# Patient Record
Sex: FEMALE | Race: WHITE | NOT HISPANIC OR LATINO | Employment: UNEMPLOYED | RURAL
[De-identification: names, ages, dates, MRNs, and addresses within clinical notes are randomized per-mention and may not be internally consistent; named-entity substitution may affect disease eponyms.]

---

## 2019-06-27 ENCOUNTER — OFFICE VISIT (OUTPATIENT)
Dept: RETAIL CLINIC | Facility: CLINIC | Age: 19
End: 2019-06-27

## 2019-06-27 VITALS
HEART RATE: 70 BPM | OXYGEN SATURATION: 100 % | BODY MASS INDEX: 24.33 KG/M2 | HEIGHT: 67 IN | WEIGHT: 155 LBS | SYSTOLIC BLOOD PRESSURE: 110 MMHG | TEMPERATURE: 97.9 F | DIASTOLIC BLOOD PRESSURE: 66 MMHG | RESPIRATION RATE: 14 BRPM

## 2019-06-27 DIAGNOSIS — J01.41 ACUTE RECURRENT PANSINUSITIS: Primary | ICD-10-CM

## 2019-06-27 DIAGNOSIS — J30.2 SEASONAL ALLERGIC RHINITIS, UNSPECIFIED TRIGGER: ICD-10-CM

## 2019-06-27 PROCEDURE — 99203 OFFICE O/P NEW LOW 30 MIN: CPT | Performed by: NURSE PRACTITIONER

## 2019-06-27 RX ORDER — SERTRALINE HYDROCHLORIDE 100 MG/1
100 TABLET, FILM COATED ORAL DAILY
Refills: 0 | COMMUNITY
Start: 2019-05-22 | End: 2022-08-03

## 2019-06-27 RX ORDER — OMEPRAZOLE 20 MG/1
20 CAPSULE, DELAYED RELEASE ORAL DAILY
Refills: 0 | COMMUNITY
Start: 2019-05-22 | End: 2022-08-03

## 2019-06-27 RX ORDER — CHLORAL HYDRATE 500 MG
CAPSULE ORAL
COMMUNITY
End: 2021-06-16

## 2019-06-27 RX ORDER — PSEUDOEPHEDRINE HCL 120 MG/1
120 TABLET, FILM COATED, EXTENDED RELEASE ORAL EVERY 12 HOURS
Qty: 20 TABLET | Refills: 0 | Status: SHIPPED | OUTPATIENT
Start: 2019-06-27 | End: 2019-07-07

## 2019-06-27 RX ORDER — BUPROPION HYDROCHLORIDE 150 MG/1
150 TABLET ORAL DAILY
Refills: 0 | COMMUNITY
Start: 2019-05-22 | End: 2019-06-27

## 2019-06-27 RX ORDER — LORATADINE 10 MG/1
10 TABLET ORAL DAILY
Qty: 30 TABLET | Refills: 5 | Status: SHIPPED | OUTPATIENT
Start: 2019-06-27 | End: 2019-07-27

## 2019-06-27 RX ORDER — AMOXICILLIN AND CLAVULANATE POTASSIUM 875; 125 MG/1; MG/1
1 TABLET, FILM COATED ORAL 2 TIMES DAILY
Qty: 20 TABLET | Refills: 0 | Status: SHIPPED | OUTPATIENT
Start: 2019-06-27 | End: 2021-06-16

## 2019-06-27 NOTE — PROGRESS NOTES
"Subjective   Alissa Plascencia is a 19 y.o. female.     Sinus Problem   This is a recurrent problem. Episode onset: 2 weeks  The problem has been gradually worsening since onset. There has been no fever. The pain is severe. Associated symptoms include congestion, coughing (mild), headaches, sinus pressure and swollen glands. Pertinent negatives include no chills, ear pain, hoarse voice, neck pain, shortness of breath, sneezing or sore throat. Past treatments include nothing.        The following portions of the patient's history were reviewed and updated as appropriate: allergies, current medications, past medical history, past social history, past surgical history and problem list.    Review of Systems   Constitutional: Negative for appetite change, chills and fever.   HENT: Positive for congestion, postnasal drip, rhinorrhea (thick greenish), sinus pressure and sinus pain (severe). Negative for ear pain, hoarse voice, sneezing, sore throat and trouble swallowing.    Eyes: Negative.    Respiratory: Positive for cough (mild). Negative for chest tightness, shortness of breath and wheezing.    Cardiovascular: Negative.    Gastrointestinal: Negative for abdominal pain, diarrhea, nausea and vomiting.   Musculoskeletal: Negative.  Negative for neck pain.   Skin: Negative.    Neurological: Positive for headaches. Negative for dizziness.   Hematological: Positive for adenopathy.        /66   Pulse 70   Temp 97.9 °F (36.6 °C)   Resp 14   Ht 170.2 cm (67\")   Wt 70.3 kg (155 lb)   LMP 06/27/2019 Comment: NEXPLANON  SpO2 100%   BMI 24.28 kg/m²      Objective   Physical Exam   Constitutional: She is oriented to person, place, and time. Vital signs are normal. She appears well-developed and well-nourished. No distress.   HENT:   Head: Normocephalic.   Right Ear: External ear and ear canal normal. No drainage, swelling or tenderness. Tympanic membrane is bulging. Tympanic membrane is not erythematous.   Left Ear: " External ear and ear canal normal. No drainage, swelling or tenderness. Tympanic membrane is bulging. Tympanic membrane is not erythematous.   Nose: Mucosal edema and rhinorrhea (thick, greenish/yellow) present. Right sinus exhibits maxillary sinus tenderness (severe) and frontal sinus tenderness. Left sinus exhibits maxillary sinus tenderness (severe) and frontal sinus tenderness.   Mouth/Throat: Uvula is midline, oropharynx is clear and moist and mucous membranes are normal. Tonsils are 0 on the right. Tonsils are 0 on the left. No tonsillar exudate.   Eyes: Conjunctivae are normal. Pupils are equal, round, and reactive to light.   Neck: Normal range of motion. Neck supple.   Cardiovascular: Normal rate, regular rhythm, S1 normal, S2 normal and normal heart sounds.   Pulmonary/Chest: Effort normal and breath sounds normal. No stridor. No respiratory distress. She has no decreased breath sounds. She has no wheezes. She has no rhonchi. She has no rales.   Abdominal: Soft. Bowel sounds are normal. She exhibits no distension. There is no tenderness. There is no rebound and no guarding.   Lymphadenopathy:        Head (right side): Tonsillar adenopathy present.        Head (left side): Tonsillar adenopathy present.     She has no cervical adenopathy.   Neurological: She is alert and oriented to person, place, and time.   Skin: Skin is warm, dry and intact. No rash noted. She is not diaphoretic.   Psychiatric: She has a normal mood and affect. Her speech is normal and behavior is normal. Thought content normal.   Vitals reviewed.      Assessment/Plan   Alissa was seen today for sinus problem and cough.    Diagnoses and all orders for this visit:    Acute recurrent pansinusitis  -     amoxicillin-clavulanate (AUGMENTIN) 875-125 MG per tablet; Take 1 tablet by mouth 2 (Two) Times a Day.  -     pseudoephedrine (SUDAFED) 120 MG 12 hr tablet; Take 1 tablet by mouth Every 12 (Twelve) Hours for 10 days.    Seasonal allergic  rhinitis, unspecified trigger  -     loratadine (CLARITIN) 10 MG tablet; Take 1 tablet by mouth Daily for 30 days.

## 2021-06-16 ENCOUNTER — OFFICE VISIT (OUTPATIENT)
Dept: NEUROLOGY | Facility: CLINIC | Age: 21
End: 2021-06-16

## 2021-06-16 VITALS
HEART RATE: 54 BPM | DIASTOLIC BLOOD PRESSURE: 76 MMHG | OXYGEN SATURATION: 100 % | SYSTOLIC BLOOD PRESSURE: 110 MMHG | HEIGHT: 66 IN | WEIGHT: 134 LBS | BODY MASS INDEX: 21.53 KG/M2

## 2021-06-16 DIAGNOSIS — F07.81 POST CONCUSSION SYNDROME: Primary | ICD-10-CM

## 2021-06-16 PROCEDURE — 99204 OFFICE O/P NEW MOD 45 MIN: CPT | Performed by: PSYCHIATRY & NEUROLOGY

## 2021-06-16 RX ORDER — BUSPIRONE HYDROCHLORIDE 10 MG/1
10 TABLET ORAL 3 TIMES DAILY
COMMUNITY
End: 2022-08-03

## 2021-06-16 RX ORDER — HYDROXYZINE HYDROCHLORIDE 10 MG/1
10 TABLET, FILM COATED ORAL 3 TIMES DAILY PRN
COMMUNITY
End: 2022-01-19 | Stop reason: ALTCHOICE

## 2021-06-16 RX ORDER — ZONISAMIDE 50 MG/1
CAPSULE ORAL
COMMUNITY
Start: 2021-03-23 | End: 2021-11-08

## 2021-06-16 NOTE — PROGRESS NOTES
Subjective:    CC: Alissa Plascencia is seen today in consultation at the request of Adelaida Lee MD for Concussion       HPI:  Patient is a 21-year-old female with past medical history of anxiety, depression referred to the clinic for evaluation of postconcussive symptoms.  She reports that back in January 2018 while taking part in cheerleading competition, she fell and hit the back of her head and the neck area and passed out for approximately 5 to 10 seconds.  Soon after the event, she was told by her  to continue taking part in the competition and no medical treatment was seek for.  Next day, she had another fall during competition where she again hit side of her head and was confused for several minutes. She reports that soon after the second fall, she started having difficulty in completing sentences, difficulty with reading became extremely sensitive to light and hence 1 week later, she was taken to sports medicine and then was eventually sent to [facility] and did physical therapy.  She does not remember what kind of testing was done when she was taken to the hospital.  She was doing better until about 2020 when she was moving her apartment and hit back of the head to the bed rail and lost consciousness for few seconds.  She was seen by physician and then was sent for neck physical therapy and was asked to wear neck brace.  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  She also has been having episodes of spacing out, confusion while at work which would last for several seconds and may occur several times in a day.  In addition, she also has this episodes of sharp shooting type of pain involving different parts of her head.  Because of this, she was started on Zonegran 50 mg twice daily by [facility]  "neurology.  She reports that she is unable to take twice daily dosing consistently but since starting the medication, overall headache and episodes of spacing out have reduced.  MRI or EEG has not been performed.  She does take Zoloft 1 tablet daily and buspirone as needed and has been taking it before she sustained the fall.    The following portions of the patient's history were reviewed today and updated as of 06/16/2021  : allergies, social history and problem list.  This document will be scanned to patient's chart.      Current Outpatient Medications:   •  busPIRone (BUSPAR) 10 MG tablet, Take 10 mg by mouth 3 (Three) Times a Day., Disp: , Rfl:   •  hydrOXYzine (ATARAX) 10 MG tablet, Take 10 mg by mouth 3 (Three) Times a Day As Needed for Itching., Disp: , Rfl:   •  omeprazole (priLOSEC) 20 MG capsule, Take 20 mg by mouth Daily., Disp: , Rfl: 0  •  sertraline (ZOLOFT) 100 MG tablet, Take 100 mg by mouth Daily., Disp: , Rfl: 0  •  zonisamide (ZONEGRAN) 50 MG capsule, PLEASE SEE ATTACHED FOR DETAILED DIRECTIONS, Disp: , Rfl:    Past Medical History:   Diagnosis Date   • Acid reflux    • Allergic    • Anxiety    • Depression    • Infectious mononucleosis    • Migraines    • Sinusitis    • Stomach ulcer       History reviewed. No pertinent surgical history.   History reviewed. No pertinent family history.   Review of Systems   Constitutional: Negative.    HENT: Negative.    Eyes: Positive for photophobia.   Respiratory: Negative.    Cardiovascular: Negative.    Gastrointestinal: Negative.    Endocrine: Negative.    Genitourinary: Negative.    Musculoskeletal: Positive for back pain and neck pain.   Skin: Negative.    Allergic/Immunologic: Negative.    Neurological: Positive for headache and confusion.   Hematological: Negative.    Psychiatric/Behavioral: The patient is nervous/anxious.        All other systems reviewed and are negative     Objective:    /76   Pulse 54   Ht 167.6 cm (66\")   Wt 60.8 kg (134 " lb)   SpO2 100%   BMI 21.63 kg/m²     Neurology Exam:    General apperance: NAD.     Mental status: Alert, awake and oriented to time place and person.    Recent and Remote memory: Can recall 3/3 objects at 5 minutes. Can recall historical events.     Attention span and Concentration: Serial 7s: Normal.     Fund of knowledge:  Normal.     Language and Speech: No aphasia or dysarthria.    Naming , Repitition and Comprehension:  Can name objects, repeat a sentence and follow commands. Speech is clear and fluent with good repetition, comprehension, and naming.    Cranial Nerves:   CN II: Visual fields are full. Intact. Fundi - Normal, No papillederma, Pupils - NICO  CN III, IV and VI: Extraocular movements are intact. Normal saccades.   CN V: Facial sensation is intact.   CN VII: Muscles of facial expression reveal no asymmetry. Intact.   CN VIII: Hearing is intact. Whispered voice intact.   CN IX and X: Palate elevates symmetrically. Intact  CN XI: Shoulder shrug is intact.   CN XII: Tongue is midline without evidence of atrophy or fasciculation.     Motor:  Right UE muscle strength 5/5. Normal tone.     Left UE muscle strength 5/5. Normal tone.      Right LE muscle strength5/5. Normal tone.     Left LE muscle strength 5/5. Normal tone.      Sensory: Normal light touch, vibration and pinprick sensation bilaterally.    DTRs: 2+ bilaterally in upper and lower extremities.    Babinski: Negative bilaterally.    Co-ordination: Normal finger-to-nose, heel to shin B/L.    Rhomberg: Negative.    Gait: Normal.    Cardiovascular: Regular rate and rhythm without murmur, gallop or rub.    Ophthalmoscopic exam: Normal fundi, no papilledema.    Assessment and Plan:  1. Post concussion syndrome  Postconcussive symptoms.  She had a fall back in 2018 and then another 1 in 2020 causing very brief loss of consciousness for about 5 seconds..  Currently the most concerning symptoms are episodes of confusion as well as postconcussive  headache.  With Zonegran 50 mg daily, symptoms have subsided somewhat.  Before any dose adjustment or new medication is considered, I would like to order MRI brain and EEG for further evaluation and based on the report, further treatment options will be discussed with her.  I have advised her to continue with light activity at work otherwise, I will see her back in 6 to 8 weeks for follow-up.       Return in about 8 weeks (around 8/11/2021).     Mehran Muniz MD

## 2021-07-19 ENCOUNTER — HOSPITAL ENCOUNTER (OUTPATIENT)
Dept: MRI IMAGING | Facility: HOSPITAL | Age: 21
Discharge: HOME | End: 2021-07-19

## 2021-07-19 ENCOUNTER — HOSPITAL ENCOUNTER (OUTPATIENT)
Dept: NEUROLOGY | Facility: HOSPITAL | Age: 21
Discharge: HOME | End: 2021-07-19

## 2021-07-19 ENCOUNTER — TELEPHONE (OUTPATIENT)
Dept: NEUROLOGY | Facility: CLINIC | Age: 21
End: 2021-07-19

## 2021-07-19 DIAGNOSIS — F07.81 POST CONCUSSION SYNDROME: ICD-10-CM

## 2021-07-19 PROCEDURE — 95816 EEG AWAKE AND DROWSY: CPT | Performed by: PSYCHIATRY & NEUROLOGY

## 2021-07-19 PROCEDURE — 95816 EEG AWAKE AND DROWSY: CPT

## 2021-07-19 PROCEDURE — 70551 MRI BRAIN STEM W/O DYE: CPT

## 2021-07-19 NOTE — TELEPHONE ENCOUNTER
----- Message from Mehran Muniz MD sent at 7/19/2021 10:05 AM EDT -----  Inform patient normal.  MRI brain is normal.

## 2021-08-05 ENCOUNTER — OFFICE VISIT (OUTPATIENT)
Dept: NEUROLOGY | Facility: CLINIC | Age: 21
End: 2021-08-05

## 2021-08-05 VITALS — HEIGHT: 66 IN | HEART RATE: 57 BPM | WEIGHT: 130 LBS | OXYGEN SATURATION: 100 % | BODY MASS INDEX: 20.89 KG/M2

## 2021-08-05 DIAGNOSIS — F07.81 POST CONCUSSION SYNDROME: Primary | ICD-10-CM

## 2021-08-05 PROCEDURE — 99214 OFFICE O/P EST MOD 30 MIN: CPT | Performed by: PSYCHIATRY & NEUROLOGY

## 2021-08-05 RX ORDER — ETONOGESTREL AND ETHINYL ESTRADIOL 11.7; 2.7 MG/1; MG/1
INSERT, EXTENDED RELEASE VAGINAL
COMMUNITY
Start: 2021-07-20

## 2021-08-05 NOTE — PROGRESS NOTES
Subjective:    CC: Alissa Plascencia is in clinic today for follow up for postconcussive symptoms.    HPI:  Initial visit: 6/16/2021: Patient is a 21-year-old female with past medical history of anxiety, depression referred to the clinic for evaluation of postconcussive symptoms.  She reports that back in January 2018 while taking part in cheerleading competition, she fell and hit the back of her head and the neck area and passed out for approximately 5 to 10 seconds.  Soon after the event, she was told by her  to continue taking part in the competition and no medical treatment was seek for.  Next day, she had another fall during competition where she again hit side of her head and was confused for several minutes. She reports that soon after the second fall, she started having difficulty in completing sentences, difficulty with reading became extremely sensitive to light and hence 1 week later, she was taken to sports medicine and then was eventually sent to [facility] and did physical therapy.  She does not remember what kind of testing was done when she was taken to the hospital.  She was doing better until about 2020 when she was moving her apartment and hit back of the head to the bed rail and lost consciousness for few seconds.  She was seen by physician and then was sent for neck physical therapy and was asked to wear neck brace.  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  She also has been having episodes of spacing out, confusion while at work which would last for several seconds and may occur several times in a day.  In addition, she also has this episodes of sharp shooting type of pain involving different parts of her head.  Because of this, she was started on Zonegran 50 mg twice daily by [facility]  neurology.  She reports that she is unable to take twice daily dosing consistently but since starting the medication, overall headache and episodes of spacing out have reduced.  MRI or EEG has not been performed.  She does take Zoloft 1 tablet daily and buspirone as needed and has been taking it before she sustained the fall.    Follow-up: 8/5/2021: She is in clinic for regular follow-up.  Since her last visit in June, she reports that overall symptoms of confusion and sharp shooting type of pain involving different parts of the head have reduced even further.  She has now completed MRI brain and EEG.  I reviewed MRI brain personally and it did not reveal any intracranial abnormalities.  EEG was essentially normal as well.    The following portions of the patient's history were reviewed and updated as of 08/05/2021: allergies, social history and problem list.       Current Outpatient Medications:   •  busPIRone (BUSPAR) 10 MG tablet, Take 10 mg by mouth 3 (Three) Times a Day., Disp: , Rfl:   •  etonogestrel-ethinyl estradiol (NUVARING) 0.12-0.015 MG/24HR vaginal ring, INSERT 1 RING EVERY 4 WEEKS AS DIRECTED, Disp: , Rfl:   •  hydrOXYzine (ATARAX) 10 MG tablet, Take 10 mg by mouth 3 (Three) Times a Day As Needed for Itching., Disp: , Rfl:   •  omeprazole (priLOSEC) 20 MG capsule, Take 20 mg by mouth Daily., Disp: , Rfl: 0  •  sertraline (ZOLOFT) 100 MG tablet, Take 100 mg by mouth Daily., Disp: , Rfl: 0  •  zonisamide (ZONEGRAN) 50 MG capsule, PLEASE SEE ATTACHED FOR DETAILED DIRECTIONS, Disp: , Rfl:    Past Medical History:   Diagnosis Date   • Acid reflux    • Allergic    • Anxiety    • Depression    • Infectious mononucleosis    • Migraines    • Sinusitis    • Stomach ulcer       History reviewed. No pertinent surgical history.   History reviewed. No pertinent family history.     Review of Systems   Constitutional: Positive for fatigue.   HENT: Negative.    Eyes: Negative.    Respiratory: Negative.   "  Cardiovascular: Negative.    Gastrointestinal: Negative.    Endocrine: Negative.    Genitourinary: Negative.    Musculoskeletal: Positive for back pain, neck pain and neck stiffness.   Skin: Negative.    Allergic/Immunologic: Negative.    Neurological: Positive for headache.   Hematological: Negative.    Psychiatric/Behavioral: Positive for depressed mood. The patient is nervous/anxious.      Objective:    Pulse 57   Ht 167.6 cm (66\")   Wt 59 kg (130 lb)   SpO2 100%   BMI 20.98 kg/m²     Neurology Exam:  General apperance: NAD.     Mental status: Alert, awake and oriented to time place and person.    Recent and Remote memory: Can recall 3/3 objects at 5 minutes. Can recall historical events.     Attention span and Concentration: Serial 7s: Normal.     Fund of knowledge:  Normal.     Language and Speech: No aphasia or dysarthria.    Naming , Repitition and Comprehension:  Can name objects, repeat a sentence and follow commands. Speech is clear and fluent with good repetition, comprehension, and naming.    CN II to XII: Intact.    Opthalmoscopic Exam: No papilledema.    Motor:  Right UE muscle strength 5/5. Normal tone.     Left UE muscle strength 5/5. Normal tone.      Right LE muscle strength5/5. Normal tone.     Left LE muscle strength 5/5. Normal tone.      Sensory: Normal light touch, vibration and pinprick sensation bilaterally.    DTRs: 2+ bilaterally.    Babinski: Negative bilaterally.    Co-ordination: Normal finger-to-nose, heel to shin B/L.    Rhomberg: Negative.    Gait: Normal.    Cardiovascular: Regular rate and rhythm without murmur, gallop or rub.    Assessment and Plan:  1. Post concussion syndrome  -Overall symptoms of episodes of confusion and headaches improved since her initial visit in June.  She has a normal MRI and EEG which is quite reassuring.  I have advised her to continue Zonegran 50 mg for now and I plan to see her back in 3 months for follow-up.  At that time, the symptoms are " reduced further than plan is to stop Zonegran completely.  I have reassured her that symptoms will completely subside eventually on its own.       I spent 30 minutes face to face with the patient and spent more than 50% of this time  in management, instructions and education regarding above mentioned diagnosis and also on counseling and discussing about taking medication regularly, possible side effects with medication use, importance of good sleep hygiene, good hydration and regular exercise.    Return in about 3 months (around 11/5/2021).

## 2021-11-08 ENCOUNTER — OFFICE VISIT (OUTPATIENT)
Dept: NEUROLOGY | Facility: CLINIC | Age: 21
End: 2021-11-08

## 2021-11-08 VITALS — HEART RATE: 77 BPM | HEIGHT: 66 IN | OXYGEN SATURATION: 99 % | BODY MASS INDEX: 20.89 KG/M2 | WEIGHT: 130 LBS

## 2021-11-08 DIAGNOSIS — F07.81 POST CONCUSSION SYNDROME: Primary | ICD-10-CM

## 2021-11-08 PROCEDURE — 99214 OFFICE O/P EST MOD 30 MIN: CPT | Performed by: PSYCHIATRY & NEUROLOGY

## 2021-11-08 RX ORDER — BUPROPION HYDROCHLORIDE 150 MG/1
150 TABLET ORAL
COMMUNITY
Start: 2021-09-23 | End: 2022-08-03

## 2021-11-08 RX ORDER — TRETINOIN 0.1 MG/G
GEL TOPICAL SEE ADMIN INSTRUCTIONS
COMMUNITY
Start: 2021-09-23 | End: 2022-01-21

## 2021-11-08 NOTE — PROGRESS NOTES
Subjective:    CC: Alissa Plascencia is in clinic today for follow up for history of postconcussive symptoms.    HPI:  Initial visit: 6/16/2021: Patient is a 21-year-old female with past medical history of anxiety, depression referred to the clinic for evaluation of postconcussive symptoms.  She reports that back in January 2018 while taking part in cheerleading competition, she fell and hit the back of her head and the neck area and passed out for approximately 5 to 10 seconds.  Soon after the event, she was told by her  to continue taking part in the competition and no medical treatment was seek for.  Next day, she had another fall during competition where she again hit side of her head and was confused for several minutes. She reports that soon after the second fall, she started having difficulty in completing sentences, difficulty with reading became extremely sensitive to light and hence 1 week later, she was taken to sports medicine and then was eventually sent to [facility] and did physical therapy.  She does not remember what kind of testing was done when she was taken to the hospital.  She was doing better until about 2020 when she was moving her apartment and hit back of the head to the bed rail and lost consciousness for few seconds.  She was seen by physician and then was sent for neck physical therapy and was asked to wear neck brace.  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  She also has been having episodes of spacing out, confusion while at work which would last for several seconds and may occur several times in a day.  In addition, she also has this episodes of sharp shooting type of pain involving different parts of her head.  Because of this, she was started on Zonegran 50 mg twice  daily by  neurology.  She reports that she is unable to take twice daily dosing consistently but since starting the medication, overall headache and episodes of spacing out have reduced.  MRI or EEG has not been performed.  She does take Zoloft 1 tablet daily and buspirone as needed and has been taking it before she sustained the fall.    Follow-up: 8/5/2021: She is in clinic for regular follow-up.  Since her last visit in June, she reports that overall symptoms of confusion and sharp shooting type of pain involving different parts of the head have reduced even further.  She has now completed MRI brain and EEG.  I reviewed MRI brain personally and it did not reveal any intracranial abnormalities.  EEG was essentially normal as well.    Follow-up: 11/8/2021: She is in clinic for regular follow-up.  Since her last visit in August, she reports that postconcussive symptoms have completely resolved.  She however has been having difficulty with sleep.  The days that she does not sleep well, next day she is fatigued and it causes muscle aches and pains in neck and lower back which ultimately causes her to have severe migraine.  She takes Advil as needed at the onset of such migraine which sometimes works and sometimes it does not.  She is also scheduled to see [facility] psychology to discuss different treatment options to help with sleep.    The following portions of the patient's history were reviewed and updated as of 11/08/2021: allergies, social history and problem list.       Current Outpatient Medications:   •  buPROPion XL (WELLBUTRIN XL) 150 MG 24 hr tablet, Take 150 mg by mouth., Disp: , Rfl:   •  busPIRone (BUSPAR) 10 MG tablet, Take 10 mg by mouth 3 (Three) Times a Day., Disp: , Rfl:   •  etonogestrel-ethinyl estradiol (NUVARING) 0.12-0.015 MG/24HR vaginal ring, INSERT 1 RING EVERY 4 WEEKS AS DIRECTED, Disp: , Rfl:   •  hydrOXYzine (ATARAX) 10 MG tablet, Take 10 mg by mouth 3 (Three) Times a Day As Needed for  "Itching., Disp: , Rfl:   •  omeprazole (priLOSEC) 20 MG capsule, Take 20 mg by mouth Daily., Disp: , Rfl: 0  •  ProAir  (90 Base) MCG/ACT inhaler, , Disp: , Rfl:   •  sertraline (ZOLOFT) 100 MG tablet, Take 100 mg by mouth Daily., Disp: , Rfl: 0  •  tretinoin (RETIN-A) 0.01 % gel, Apply  topically to the appropriate area as directed See Admin Instructions., Disp: , Rfl:   •  ubrogepant (ubrogepant) 100 MG tablet, Take 1 tablet by mouth As Needed (Migraine) for up to 30 days., Disp: 16 tablet, Rfl: 3   Past Medical History:   Diagnosis Date   • Acid reflux    • Allergic    • Anxiety    • Depression    • Infectious mononucleosis    • Migraines    • Sinusitis    • Stomach ulcer       History reviewed. No pertinent surgical history.   History reviewed. No pertinent family history.     Review of Systems   Constitutional: Negative.    HENT: Negative.    Eyes: Negative.    Respiratory: Negative.    Cardiovascular: Negative.    Gastrointestinal: Negative.    Endocrine: Negative.    Genitourinary: Negative.    Musculoskeletal: Negative.    Skin: Negative.    Allergic/Immunologic: Negative.    Neurological: Positive for headache.   Hematological: Negative.    Psychiatric/Behavioral: Negative.      Objective:    Pulse 77   Ht 167.6 cm (66\")   Wt 59 kg (130 lb)   SpO2 99%   BMI 20.98 kg/m²     Neurology Exam:  General apperance: NAD.     Mental status: Alert, awake and oriented to time place and person.    Recent and Remote memory: Can recall 3/3 objects at 5 minutes. Can recall historical events.     Attention span and Concentration: Serial 7s: Normal.     Fund of knowledge:  Normal.     Language and Speech: No aphasia or dysarthria.    Naming , Repitition and Comprehension:  Can name objects, repeat a sentence and follow commands. Speech is clear and fluent with good repetition, comprehension, and naming.    CN II to XII: Intact.    Opthalmoscopic Exam: No papilledema.    Motor:  Right UE muscle strength 5/5. " Normal tone.     Left UE muscle strength 5/5. Normal tone.      Right LE muscle strength5/5. Normal tone.     Left LE muscle strength 5/5. Normal tone.      Sensory: Normal light touch, vibration and pinprick sensation bilaterally.    DTRs: 2+ bilaterally.    Babinski: Negative bilaterally.    Co-ordination: Normal finger-to-nose, heel to shin B/L.    Rhomberg: Negative.    Gait: Normal.    Cardiovascular: Regular rate and rhythm without murmur, gallop or rub.    Assessment and Plan:  1. Post concussion syndrome  -Postconcussive symptoms have resolved completely however, she is having trouble with sleep and is scheduled to see [facility] for treatment options.  She is reporting that lack of sleep is triggering migraines.  I am going to prescribe her Ubrelvy to be taken as needed as an abortive treatment and I will plan to see her back in 3 months for follow-up.       I spent 30 minutes face to face with the patient and spent more than 50% of this time  in management, instructions and education regarding above mentioned diagnosis and also on counseling and discussing about taking medication regularly, possible side effects with medication use, importance of good sleep hygiene, good hydration and regular exercise.    Return in about 3 months (around 2/8/2022).

## 2021-12-20 ENCOUNTER — TELEPHONE (OUTPATIENT)
Dept: NEUROLOGY | Facility: CLINIC | Age: 21
End: 2021-12-20

## 2021-12-20 NOTE — TELEPHONE ENCOUNTER
Caller: FAITH     Best call back number:  [phone]    What was the call regarding: RESCHED PT BUT APPT NEEDS TO BE CHANGED TO VIDEO APPT. ALSO SHE SAID  ORDERED UBROGEPANT ?   BUT INSURANCE  STILL HAS NOT  APPROVED? CAN YOU CHECK ON THIS     [id number] - [place], [place] - [address] [phone] [facility] [phone]   [phone]    Do you require a callback: YES

## 2022-01-19 PROCEDURE — U0004 COV-19 TEST NON-CDC HGH THRU: HCPCS | Performed by: NURSE PRACTITIONER

## 2022-02-17 ENCOUNTER — TELEMEDICINE (OUTPATIENT)
Dept: NEUROLOGY | Facility: CLINIC | Age: 22
End: 2022-02-17

## 2022-02-17 DIAGNOSIS — G43.019 INTRACTABLE MIGRAINE WITHOUT AURA AND WITHOUT STATUS MIGRAINOSUS: Primary | ICD-10-CM

## 2022-02-17 PROCEDURE — 99213 OFFICE O/P EST LOW 20 MIN: CPT | Performed by: PSYCHIATRY & NEUROLOGY

## 2022-02-17 RX ORDER — LAMOTRIGINE 25 MG/1
50 TABLET ORAL 2 TIMES DAILY
Qty: 120 TABLET | Refills: 3 | Status: SHIPPED | OUTPATIENT
Start: 2022-02-17 | End: 2022-03-19

## 2022-02-17 NOTE — PROGRESS NOTES
Subjective:    CC: Alissa Plascencia is followed for history of postconcussive symptoms, migraines.    HPI:  Initial visit: 6/16/2021: Patient is a 21-year-old female with past medical history of anxiety, depression referred to the clinic for evaluation of postconcussive symptoms.  She reports that back in January 2018 while taking part in cheerleading competition, she fell and hit the back of her head and the neck area and passed out for approximately 5 to 10 seconds.  Soon after the event, she was told by her  to continue taking part in the competition and no medical treatment was seek for.  Next day, she had another fall during competition where she again hit side of her head and was confused for several minutes. She reports that soon after the second fall, she started having difficulty in completing sentences, difficulty with reading became extremely sensitive to light and hence 1 week later, she was taken to sports medicine and then was eventually sent to [facility] and did physical therapy.  She does not remember what kind of testing was done when she was taken to the hospital.  She was doing better until about 2020 when she was moving her apartment and hit back of the head to the bed rail and lost consciousness for few seconds.  She was seen by physician and then was sent for neck physical therapy and was asked to wear neck brace.  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  She also has been having episodes of spacing out, confusion while at work which would last for several seconds and may occur several times in a day.  In addition, she also has this episodes of sharp shooting type of pain involving different parts of her head.  Because of this, she was started on Zonegran 50 mg twice daily by   neurology.  She reports that she is unable to take twice daily dosing consistently but since starting the medication, overall headache and episodes of spacing out have reduced.  MRI or EEG has not been performed.  She does take Zoloft 1 tablet daily and buspirone as needed and has been taking it before she sustained the fall.    Follow-up: 8/5/2021: She is in clinic for regular follow-up.  Since her last visit in June, she reports that overall symptoms of confusion and sharp shooting type of pain involving different parts of the head have reduced even further.  She has now completed MRI brain and EEG.  I reviewed MRI brain personally and it did not reveal any intracranial abnormalities.  EEG was essentially normal as well.    Follow-up: 11/8/2021: She is in clinic for regular follow-up.  Since her last visit in August, she reports that postconcussive symptoms have completely resolved.  She however has been having difficulty with sleep.  The days that she does not sleep well, next day she is fatigued and it causes muscle aches and pains in neck and lower back which ultimately causes her to have severe migraine.  She takes Advil as needed at the onset of such migraine which sometimes works and sometimes it does not.  She is also scheduled to see [facility] psychology to discuss different treatment options to help with sleep.    Follow-up: 2/17/2022: This is a follow-up done through video.  Since her last visit in November 2021, she reports that postconcussive symptoms have resolved completely but she had another fall couple of weeks ago where she slid on the ice and landed on her left shoulder.  Since then, she has had daily headache.  She also reports that overall migraine frequency and intensity has increased.  Ubrelvy was not approved by insurance so she could not try it.  Currently she is reporting headache occurring almost on a daily basis and intense migraines occurring at least once or twice in a week.  She  also continues to struggle with poor sleep quality.  She has not yet seen clinical psychologist.    The following portions of the patient's history were reviewed and updated as of 02/17/2022: allergies, social history and problem list.       Current Outpatient Medications:   •  buPROPion XL (WELLBUTRIN XL) 150 MG 24 hr tablet, Take 150 mg by mouth., Disp: , Rfl:   •  busPIRone (BUSPAR) 10 MG tablet, Take 10 mg by mouth 3 (Three) Times a Day., Disp: , Rfl:   •  etonogestrel-ethinyl estradiol (NUVARING) 0.12-0.015 MG/24HR vaginal ring, INSERT 1 RING EVERY 4 WEEKS AS DIRECTED, Disp: , Rfl:   •  lamoTRIgine (LaMICtal) 25 MG tablet, Take 2 tablets by mouth 2 (Two) Times a Day for 30 days., Disp: 120 tablet, Rfl: 3  •  omeprazole (priLOSEC) 20 MG capsule, Take 20 mg by mouth Daily., Disp: , Rfl: 0  •  ProAir  (90 Base) MCG/ACT inhaler, , Disp: , Rfl:   •  sertraline (ZOLOFT) 100 MG tablet, Take 100 mg by mouth Daily., Disp: , Rfl: 0   Past Medical History:   Diagnosis Date   • Acid reflux    • Allergic    • Anxiety    • Depression    • Infectious mononucleosis    • Migraines    • Sinusitis    • Stomach ulcer       No past surgical history on file.   No family history on file.     Review of Systems  Objective:    There were no vitals taken for this visit.    Neurology Exam:  General apperance: NAD.     Mental status: Alert, awake and oriented to time place and person.    Recent and Remote memory: Can recall 3/3 objects at 5 minutes. Can recall historical events.     Attention span and Concentration: Serial 7s: Normal.     Fund of knowledge:  Normal.     Language and Speech: No aphasia or dysarthria.    Naming , Repitition and Comprehension:  Can name objects, repeat a sentence and follow commands. Speech is clear and fluent with good repetition, comprehension, and naming.    CN II to XII: Intact.    Opthalmoscopic Exam: No papilledema.    Motor:  Right UE muscle strength 5/5. Normal tone.     Left UE muscle strength  5/5. Normal tone.      Right LE muscle strength5/5. Normal tone.     Left LE muscle strength 5/5. Normal tone.      Sensory: Normal light touch, vibration and pinprick sensation bilaterally.    DTRs: 2+ bilaterally.    Babinski: Negative bilaterally.    Co-ordination: Normal finger-to-nose, heel to shin B/L.    Rhomberg: Negative.    Gait: Normal.    Cardiovascular: Regular rate and rhythm without murmur, gallop or rub.    Assessment and Plan:  1. Intractable migraine without aura and without status migrainosus  -She reports that her postconcussive symptoms have resolved completely however she had another fall 2 weeks ago and since then, she is experiencing daily headache.  Luckily she did not hit her head.  Since she is reporting worsening in migraine frequency with current migraine frequency of 8 migraine days in a month, I am going to start her on lamotrigine 25 mg at bedtime slowly increasing to 50 mg twice daily dose.  Lamotrigine being mood stabilizer, it will help with anxiety and mood as well.  Based on the response to lamotrigine, further dose adjustment will be made.  I will plan to see her back in clinic in 6 to 8 weeks for follow-up.    This was a follow-up done through video and total time spent was 15 minutes.    Return in about 8 weeks (around 4/14/2022).

## 2022-02-18 ENCOUNTER — TELEPHONE (OUTPATIENT)
Dept: NEUROLOGY | Facility: CLINIC | Age: 22
End: 2022-02-18

## 2022-02-23 ENCOUNTER — TELEPHONE (OUTPATIENT)
Dept: NEUROLOGY | Facility: CLINIC | Age: 22
End: 2022-02-23

## 2022-02-23 NOTE — TELEPHONE ENCOUNTER
Caller: Alissa Plascencia    Relationship: Self    Best call back number: [phone]    What medications are you currently taking:   Current Outpatient Medications on File Prior to Visit   Medication Sig Dispense Refill   • buPROPion XL (WELLBUTRIN XL) 150 MG 24 hr tablet Take 150 mg by mouth.     • busPIRone (BUSPAR) 10 MG tablet Take 10 mg by mouth 3 (Three) Times a Day.     • etonogestrel-ethinyl estradiol (NUVARING) 0.12-0.015 MG/24HR vaginal ring INSERT 1 RING EVERY 4 WEEKS AS DIRECTED     • lamoTRIgine (LaMICtal) 25 MG tablet Take 2 tablets by mouth 2 (Two) Times a Day for 30 days. 120 tablet 3   • omeprazole (priLOSEC) 20 MG capsule Take 20 mg by mouth Daily.  0   • ProAir  (90 Base) MCG/ACT inhaler      • sertraline (ZOLOFT) 100 MG tablet Take 100 mg by mouth Daily.  0     No current facility-administered medications on file prior to visit.      When did you start taking these medications: 2-18-22    Which medication are you concerned about: lamoTRIgine (LaMICtal) 25 MG tablet    Who prescribed you this medication: TOMEKA    What are your concerns: THE RX MAKES THE PT FEEL BETTER BUT IT IS AFFECTING HER SLEEP. THE PT IS HAVING A HARD TIME GETTING TO SLEEP AT NIGHT. MOST NIGHTS SHE ISN'T ABLE TO GET TO SLEEP UNTIL 4AM AND THEN THE PT IS HAVING ISSUES WAKING UP AND IT IS AFFECTING HER JOB.    PLEASE LET THE PT KNOW WHAT SHE NEEDS TO DO.

## 2022-08-03 PROCEDURE — U0004 COV-19 TEST NON-CDC HGH THRU: HCPCS | Performed by: NURSE PRACTITIONER

## 2025-02-27 ENCOUNTER — PATIENT ROUNDING (BHMG ONLY) (OUTPATIENT)
Dept: URGENT CARE | Facility: CLINIC | Age: 25
End: 2025-02-27
Payer: COMMERCIAL

## 2025-06-19 ENCOUNTER — PATIENT ROUNDING (BHMG ONLY) (OUTPATIENT)
Dept: URGENT CARE | Facility: CLINIC | Age: 25
End: 2025-06-19
Payer: COMMERCIAL